# Patient Record
Sex: FEMALE
[De-identification: names, ages, dates, MRNs, and addresses within clinical notes are randomized per-mention and may not be internally consistent; named-entity substitution may affect disease eponyms.]

---

## 2022-09-01 PROBLEM — Z00.00 ENCOUNTER FOR PREVENTIVE HEALTH EXAMINATION: Status: ACTIVE | Noted: 2022-09-01

## 2022-09-02 ENCOUNTER — APPOINTMENT (OUTPATIENT)
Dept: ORTHOPEDIC SURGERY | Facility: CLINIC | Age: 87
End: 2022-09-02

## 2022-09-02 DIAGNOSIS — I10 ESSENTIAL (PRIMARY) HYPERTENSION: ICD-10-CM

## 2022-09-02 DIAGNOSIS — M16.11 UNILATERAL PRIMARY OSTEOARTHRITIS, RIGHT HIP: ICD-10-CM

## 2022-09-02 PROCEDURE — 99203 OFFICE O/P NEW LOW 30 MIN: CPT

## 2022-09-02 NOTE — PHYSICAL EXAM
[Right] : right hip [4___] : extension 4[unfilled]/5 [2+] : posterior tibialis pulse: 2+ [] : ambulation with cane [[id number]] : flexion 100 degrees

## 2022-09-02 NOTE — DATA REVIEWED
[MRI] : MRI [Right] : of the right [Hip] : hip [Report was reviewed and noted in the chart] : The report was reviewed and noted in the chart [I reviewed the films/CD and agree] : I reviewed the films/CD and agree [FreeTextEntry1] : EXAM: MRI HIP RT WO CONT  CLINICAL HISTORY: 87 years old. Right hip pain.  TECHNIQUE: Imaging was performed without intravenous contrast. Multiplanar, multisequence MR images of the right hip were obtained.  COMPARISON: CT of the abdomen and pelvis from 6/7/2019 and radiograph of the right hip from 1/8/2013  FINDINGS: Confluent subchondral T1 hypointense and T2 hyperintense signal involving the superior right femoral head is consistent with prominent subchondral cyst. There is full-thickness chondral loss in the right hip. There are associated subchondral cystic changes and osteophytes in the right femoral head and acetabulum. There is chronic diffuse degeneration and tearing of the right acetabular labrum.  There is diffuse mild chondral wear in the left hip.  The pubic symphysis and the sacroiliac joints are intact, and without subarticular marrow edema.  There is a moderate right hip joint effusion with internal heterogeneity of fluid signal suggestive of superimposed synovitis. No large bursal distention. 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  The visualized intra-abdominal viscera are unremarkable.  IMPRESSION:  1. 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 2. 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 3. Moderate right hip joint effusion and synovitis. 4. Mild to moderate osteoarthritis of the left hip. 3. 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

## 2022-09-02 NOTE — HISTORY OF PRESENT ILLNESS
[de-identified] : Patient presents for evaluation of right hip pain.  She reports being more sedentary since the start of the pandemic and having right hip and groin pain.  She saw her PCP who ordered MRI hip and lumbar XR.  She walks with assistance of a cane.  Takes tylenol for pain.

## 2022-09-02 NOTE — DISCUSSION/SUMMARY
[de-identified] : I reviewed patient's MRI and discussed her condition and treatment options.  She defers surgical intervention (total hip arthroplasty).  Plan to proceed with steroid injection under fluoroscopy.  Follow up in 4 weeks.  Patient voiced understanding and agreement with the plan.\par

## 2023-02-06 ENCOUNTER — OFFICE (OUTPATIENT)
Dept: URBAN - METROPOLITAN AREA CLINIC 8 | Facility: CLINIC | Age: 88
Setting detail: OPHTHALMOLOGY
End: 2023-02-06
Payer: MEDICARE

## 2023-02-06 ENCOUNTER — RX ONLY (RX ONLY)
Age: 88
End: 2023-02-06

## 2023-02-06 DIAGNOSIS — H35.3131: ICD-10-CM

## 2023-02-06 DIAGNOSIS — H25.12: ICD-10-CM

## 2023-02-06 DIAGNOSIS — H26.491: ICD-10-CM

## 2023-02-06 DIAGNOSIS — H16.223: ICD-10-CM

## 2023-02-06 PROCEDURE — 92134 CPTRZ OPH DX IMG PST SGM RTA: CPT | Performed by: OPHTHALMOLOGY

## 2023-02-06 PROCEDURE — 99204 OFFICE O/P NEW MOD 45 MIN: CPT | Performed by: OPHTHALMOLOGY

## 2023-02-06 PROCEDURE — 66821 AFTER CATARACT LASER SURGERY: CPT | Performed by: OPHTHALMOLOGY

## 2023-02-06 ASSESSMENT — REFRACTION_AUTOREFRACTION
OD_AXIS: 093
OS_AXIS: 098
OD_CYLINDER: -2.75
OS_CYLINDER: -3.75
OS_SPHERE: -5.00
OD_SPHERE: +0.75

## 2023-02-06 ASSESSMENT — REFRACTION_MANIFEST
OS_VA1: 20/250
OS_VA2: 20/50(J5)
OS_AXIS: 100
OD_CYLINDER: -2.75
OD_AXIS: 095
OS_ADD: +4.00
OS_SPHERE: -2.00
OD_VA1: 20/80
OU_VA: 20/70
OD_ADD: +4.00
OD_VA2: 20/50(J5)
OS_CYLINDER: -3.75
OD_SPHERE: +0.75

## 2023-02-06 ASSESSMENT — AXIALLENGTH_DERIVED
OS_AL: 26.4394
OD_AL: 23.9794
OS_AL: 25.0486
OD_AL: 23.9794

## 2023-02-06 ASSESSMENT — REFRACTION_CURRENTRX
OS_CYLINDER: SPHERE
OS_OVR_VA: 20/
OS_SPHERE: +6.00
OD_OVR_VA: 20/
OD_CYLINDER: SPHERE
OD_SPHERE: +6.00
OD_VPRISM_DIRECTION: SV
OS_VPRISM_DIRECTION: SV

## 2023-02-06 ASSESSMENT — KERATOMETRY
OD_AXISANGLE_DEGREES: 006
OD_K2POWER_DIOPTERS: 44.00
OS_K1POWER_DIOPTERS: 43.50
OD_K1POWER_DIOPTERS: 42.25
METHOD_AUTO_MANUAL: AUTO
OS_K2POWER_DIOPTERS: 44.25
OS_AXISANGLE_DEGREES: 012

## 2023-02-06 ASSESSMENT — SPHEQUIV_DERIVED
OS_SPHEQUIV: -6.875
OD_SPHEQUIV: -0.625
OS_SPHEQUIV: -3.875
OD_SPHEQUIV: -0.625

## 2023-02-06 ASSESSMENT — CONFRONTATIONAL VISUAL FIELD TEST (CVF)
OD_FINDINGS: FULL
OS_FINDINGS: FULL

## 2023-02-06 ASSESSMENT — VISUAL ACUITY
OS_BCVA: 20/250
OD_BCVA: 20/250

## 2023-08-30 ENCOUNTER — OFFICE (OUTPATIENT)
Dept: URBAN - METROPOLITAN AREA CLINIC 8 | Facility: CLINIC | Age: 88
Setting detail: OPHTHALMOLOGY
End: 2023-08-30
Payer: MEDICARE

## 2023-08-30 DIAGNOSIS — H16.223: ICD-10-CM

## 2023-08-30 DIAGNOSIS — H25.12: ICD-10-CM

## 2023-08-30 DIAGNOSIS — Z96.1: ICD-10-CM

## 2023-08-30 DIAGNOSIS — H35.3131: ICD-10-CM

## 2023-08-30 PROCEDURE — 99213 OFFICE O/P EST LOW 20 MIN: CPT | Performed by: OPHTHALMOLOGY

## 2023-08-30 ASSESSMENT — KERATOMETRY
OD_AXISANGLE_DEGREES: 013
OD_K1POWER_DIOPTERS: 42.00
METHOD_AUTO_MANUAL: AUTO
OS_K2POWER_DIOPTERS: 44.00
OD_K2POWER_DIOPTERS: 44.00
OS_K1POWER_DIOPTERS: 42.75
OS_AXISANGLE_DEGREES: 004

## 2023-08-30 ASSESSMENT — REFRACTION_MANIFEST
OS_AXIS: 095
OS_CYLINDER: -2.75
OD_SPHERE: +0.75
OD_AXIS: 100
OD_AXIS: 100
OS_SPHERE: -2.00
OD_SPHERE: +0.75
OS_VA1: 20/300
OD_VA1: 20/60
OD_ADD: +4.00
OD_ADD: +3.50
OS_ADD: +3.50
OS_CYLINDER: -2.75
OD_CYLINDER: -2.75
OS_AXIS: 095
OS_VA2: 20/50(J5)
OS_VA2: 20/50(J5)
OS_ADD: +4.00
OD_CYLINDER: -2.75
OU_VA: 20/100
OU_VA: 20/100
OD_VA2: 20/50(J5)
OS_SPHERE: -3.00
OD_VA2: 20/50(J5)

## 2023-08-30 ASSESSMENT — SPHEQUIV_DERIVED
OD_SPHEQUIV: -0.625
OS_SPHEQUIV: -16.375
OD_SPHEQUIV: -0.625
OD_SPHEQUIV: -0.625
OS_SPHEQUIV: -4.375
OS_SPHEQUIV: -3.375

## 2023-08-30 ASSESSMENT — REFRACTION_AUTOREFRACTION
OD_CYLINDER: -2.75
OS_CYLINDER: -2.75
OD_AXIS: 098
OD_SPHERE: +0.75
OS_AXIS: 094
OS_SPHERE: -15.00

## 2023-08-30 ASSESSMENT — CONFRONTATIONAL VISUAL FIELD TEST (CVF)
OD_FINDINGS: FULL
OS_FINDINGS: FULL

## 2023-08-30 ASSESSMENT — AXIALLENGTH_DERIVED
OS_AL: 25.4826
OS_AL: 32.42
OD_AL: 24.0269
OS_AL: 25.036
OD_AL: 24.0269
OD_AL: 24.0269

## 2023-08-30 ASSESSMENT — REFRACTION_CURRENTRX
OS_CYLINDER: SPHERE
OD_CYLINDER: SPHERE
OD_SPHERE: +6.00
OS_VPRISM_DIRECTION: SV
OD_VPRISM_DIRECTION: SV
OS_OVR_VA: 20/
OD_OVR_VA: 20/
OS_SPHERE: +6.00

## 2023-08-30 ASSESSMENT — VISUAL ACUITY
OS_BCVA: 20/200
OD_BCVA: 20/500